# Patient Record
Sex: MALE | Race: WHITE | Employment: FULL TIME | URBAN - NONMETROPOLITAN AREA
[De-identification: names, ages, dates, MRNs, and addresses within clinical notes are randomized per-mention and may not be internally consistent; named-entity substitution may affect disease eponyms.]

---

## 2021-01-02 ENCOUNTER — HOSPITAL ENCOUNTER (EMERGENCY)
Age: 26
Discharge: HOME | End: 2021-01-02
Payer: COMMERCIAL

## 2021-01-02 VITALS
TEMPERATURE: 97.1 F | RESPIRATION RATE: 16 BRPM | OXYGEN SATURATION: 99 % | SYSTOLIC BLOOD PRESSURE: 151 MMHG | DIASTOLIC BLOOD PRESSURE: 93 MMHG | WEIGHT: 206 LBS | HEART RATE: 78 BPM

## 2021-01-02 DIAGNOSIS — J02.9 ACUTE PHARYNGITIS, UNSPECIFIED ETIOLOGY: Primary | ICD-10-CM

## 2021-01-02 LAB
GROUP A STREP CULTURE, REFLEX: NEGATIVE
REFLEX THROAT C + S: NORMAL

## 2021-01-02 PROCEDURE — 87880 STREP A ASSAY W/OPTIC: CPT

## 2021-01-02 PROCEDURE — 99212 OFFICE O/P EST SF 10 MIN: CPT

## 2021-01-02 PROCEDURE — 99203 OFFICE O/P NEW LOW 30 MIN: CPT | Performed by: NURSE PRACTITIONER

## 2021-01-02 PROCEDURE — 87070 CULTURE OTHR SPECIMN AEROBIC: CPT

## 2021-01-02 ASSESSMENT — ENCOUNTER SYMPTOMS
SHORTNESS OF BREATH: 0
ABDOMINAL PAIN: 0
APNEA: 0
VOICE CHANGE: 0
CHEST TIGHTNESS: 0
WHEEZING: 0
TROUBLE SWALLOWING: 0
EYE DISCHARGE: 0
STRIDOR: 0
RHINORRHEA: 0
SINUS CONGESTION: 0
COUGH: 0
CHOKING: 0
SORE THROAT: 1

## 2021-01-02 ASSESSMENT — PAIN DESCRIPTION - ONSET: ONSET: PROGRESSIVE

## 2021-01-02 ASSESSMENT — PAIN DESCRIPTION - DESCRIPTORS: DESCRIPTORS: ACHING

## 2021-01-02 ASSESSMENT — PAIN DESCRIPTION - FREQUENCY: FREQUENCY: CONTINUOUS

## 2021-01-02 ASSESSMENT — PAIN DESCRIPTION - PROGRESSION: CLINICAL_PROGRESSION: GRADUALLY WORSENING

## 2021-01-02 ASSESSMENT — PAIN SCALES - GENERAL: PAINLEVEL_OUTOF10: 6

## 2021-01-02 NOTE — ED TRIAGE NOTES
Pt to room 7 with c/o a sore throat and a large lump in the back of his throat starting this afternoon. He does report that he had been drinking all night and went to bed at 0900 this AM. He states his girlfriend told him he was snoring all day and choking and coughing in his sleep.

## 2021-01-02 NOTE — ED PROVIDER NOTES
[facility]  Urgent Care Encounter      CHIEF COMPLAINT       Chief Complaint   Patient presents with    Pharyngitis       Nurses Notes reviewed and I agree except as noted in the HPI. HISTORY OFPRESENT ILLNESS   Heath Agee is a 26 y.o. The history is provided by the patient. No  was used. Pharyngitis  Location:  Generalized  Quality:  Sore  Severity:  Moderate  Onset quality:  Sudden  Duration:  10 hours  Timing:  Constant  Progression:  Unchanged  Chronicity:  New  Relieved by:  Nothing  Worsened by:  Nothing  Ineffective treatments:  None tried  Associated symptoms: no abdominal pain, no adenopathy, no chest pain, no chills, no cough, no drooling, no ear discharge, no ear pain, no epistaxis, no eye discharge, no fever, no headaches, no neck stiffness, no night sweats, no plugged ear sensation, no postnasal drip, no rash, no rhinorrhea, no shortness of breath, no sinus congestion, no stridor, no trouble swallowing and no voice change    Risk factors: no exposure to strep, no exposure to mono, no sick contacts, no recent dental procedure, no recent endoscopy and no recent ENT procedure    Risk factors comment:  Holiday parties      REVIEW OF SYSTEMS     Review of Systems   Constitutional: Negative for activity change, appetite change, chills, diaphoresis, fatigue, fever and night sweats. HENT: Positive for sore throat. Negative for drooling, ear discharge, ear pain, nosebleeds, postnasal drip, rhinorrhea, trouble swallowing and voice change. Eyes: Negative for discharge. Respiratory: Negative for apnea, cough, choking, chest tightness, shortness of breath, wheezing and stridor. Cardiovascular: Negative for chest pain, palpitations and leg swelling. Gastrointestinal: Negative for abdominal pain. Musculoskeletal: Negative for neck stiffness. Skin: Negative for rash. Neurological: Negative for dizziness, light-headedness and headaches. Hematological: Negative for adenopathy. PAST MEDICAL HISTORY   History reviewed. No pertinent past medical history. SURGICAL HISTORY     Patient  has no past surgical history on file. CURRENT MEDICATIONS       Previous Medications    ONDANSETRON (ZOFRAN) 4 MG TABLET    Take 1 tablet by mouth every 8 hours as needed for Nausea or Vomiting       ALLERGIES     Patient is has No Known Allergies. FAMILY HISTORY     Patient's family history is not on file. SOCIAL HISTORY     Patient  reports that he has never smoked. He has never used smokeless tobacco. He reports current alcohol use. He reports that he does not use drugs. PHYSICAL EXAM     ED TRIAGE VITALS  BP: (!) 151/93, Temp: 97.1 °F (36.2 °C), Pulse: 78, Resp: 16, SpO2: 99 %  Physical Exam  Vitals signs and nursing note reviewed. Constitutional:       Appearance: He is well-developed and normal weight. HENT:      Head: Normocephalic and atraumatic. Right Ear: Ear canal normal.      Left Ear: Tympanic membrane and ear canal normal.      Mouth/Throat:      Mouth: Mucous membranes are moist.      Pharynx: Uvula midline. Pharyngeal swelling and uvula swelling present. Tonsils: No tonsillar exudate or tonsillar abscesses. Eyes:      Conjunctiva/sclera: Conjunctivae normal.   Neck:      Musculoskeletal: Normal range of motion. Cardiovascular:      Rate and Rhythm: Normal rate and regular rhythm. Heart sounds: No murmur. No friction rub. No gallop. Pulmonary:      Effort: Pulmonary effort is normal. No respiratory distress. Breath sounds: Normal breath sounds. No stridor. No wheezing, rhonchi or rales. Chest:      Chest wall: No tenderness. Skin:     General: Skin is warm. Neurological:      General: No focal deficit present. Mental Status: He is alert and oriented to person, place, and time.    Psychiatric:         Mood and Affect: Mood normal.         Behavior: Behavior normal.         DIAGNOSTIC RESULTS Labs:  Results for orders placed or performed during the hospital encounter of 01/02/21   Strep A culture, throat    Specimen: Throat   Result Value Ref Range    REFLEX THROAT C + S INDICATED    STREP A ANTIGEN   Result Value Ref Range    GROUP A STREP CULTURE, REFLEX Negative        IMAGING:  No orders to display     URGENT CARE COURSE:     Vitals:    01/02/21 1724   BP: (!) 151/93   Pulse: 78   Resp: 16   Temp: 97.1 °F (36.2 °C)   TempSrc: Temporal   SpO2: 99%   Weight: 206 lb (93.4 kg)       Medications - No data to display  PROCEDURES:  None  FINAL IMPRESSION      1. Acute pharyngitis, unspecified etiology        DISPOSITION/PLAN     Patient has experienced symptoms related to viral pharyngitis for less than a week, including sore throat, trouble swallowing, hoarseness to voice without complication of upper respiratory illness. Patient has oropharyngeal edema and erythema without exudate or tonsillar involvement. Patient was given education on increasing fluids, salt water gargles, use of honey, and resting voice for symptomatic care. Patient states understanding of home care. Patient is agreeable to the treatment plan and will follow up with her primary care provider within the next week or return here or go to the emergency department for any changes or concerns. The patient left without any assistance.       PATIENT REFERRED TO:  Vick Huggins 67  [place] [address]  [phone]    Schedule an appointment as soon as possible for a visit in 1 week  For re-check    DISCHARGE MEDICATIONS:  New Prescriptions    MAGIC MOUTHWASH (MIRACLE MOUTHWASH)    Swish and spit 15 mLs 4 times daily as needed for Irritation Equal parts Benedryl  Maalox   Viscous Lidocaine     Current Discharge Medication List          CHARLENE Lackey CNP, APRN - CNP  01/02/21 9996

## 2021-01-04 LAB — THROAT/NOSE CULTURE: NORMAL
